# Patient Record
Sex: FEMALE | Race: AMERICAN INDIAN OR ALASKA NATIVE
[De-identification: names, ages, dates, MRNs, and addresses within clinical notes are randomized per-mention and may not be internally consistent; named-entity substitution may affect disease eponyms.]

---

## 2019-01-31 ENCOUNTER — HOSPITAL ENCOUNTER (EMERGENCY)
Dept: HOSPITAL 5 - ED | Age: 29
Discharge: HOME | End: 2019-01-31
Payer: SELF-PAY

## 2019-01-31 VITALS — DIASTOLIC BLOOD PRESSURE: 79 MMHG | SYSTOLIC BLOOD PRESSURE: 148 MMHG

## 2019-01-31 DIAGNOSIS — F12.10: ICD-10-CM

## 2019-01-31 DIAGNOSIS — N76.0: ICD-10-CM

## 2019-01-31 DIAGNOSIS — F17.200: ICD-10-CM

## 2019-01-31 DIAGNOSIS — A59.01: Primary | ICD-10-CM

## 2019-01-31 LAB
BILIRUB UR QL STRIP: (no result)
BLOOD UR QL VISUAL: (no result)
MUCOUS THREADS #/AREA URNS HPF: (no result) /HPF
PH UR STRIP: 5 [PH] (ref 5–7)
PROT UR STRIP-MCNC: (no result) MG/DL
RBC #/AREA URNS HPF: 11 /HPF (ref 0–6)
UROBILINOGEN UR-MCNC: 2 MG/DL (ref ?–2)
WBC #/AREA URNS HPF: 4 /HPF (ref 0–6)

## 2019-01-31 PROCEDURE — 96372 THER/PROPH/DIAG INJ SC/IM: CPT

## 2019-01-31 PROCEDURE — 99284 EMERGENCY DEPT VISIT MOD MDM: CPT

## 2019-01-31 PROCEDURE — 87591 N.GONORRHOEAE DNA AMP PROB: CPT

## 2019-01-31 PROCEDURE — 81025 URINE PREGNANCY TEST: CPT

## 2019-01-31 PROCEDURE — 81001 URINALYSIS AUTO W/SCOPE: CPT

## 2019-01-31 PROCEDURE — 87210 SMEAR WET MOUNT SALINE/INK: CPT

## 2019-01-31 NOTE — EMERGENCY DEPARTMENT REPORT
ED Female  HPI





- General


Chief complaint: Urogenital-Female


Stated complaint: VAGINAL DISCHARGE


Time Seen by Provider: 01/31/19 16:53


Source: patient


Mode of arrival: Ambulatory


Limitations: No Limitations





- History of Present Illness


Initial comments: 





This is a 28-year-old female presents to the complaining of malodorous vaginal 

discharge has been going on for about 2 weeks.  Patient states the past couple 

of days her vaginal discharge has gotten worse, she describes it as yellow, 

thick with vaginal itching.


She states last menstrual period was January 11 2019.  Patient reports being 

sexually active.  She denies pelvic pain, fever, nausea vomiting, abdominal 

pain, dysuria


MD Complaint: vaginal discharge


-: Gradual, week(s) (2)


Location: labia


Severity: moderate


Severity scale (0 -10): 5


Are you Pregnant Now?: No


Last Menstrual Period: 01/11/19


EDC: 10/18/19


Associated Symptoms: vaginal discharge.  denies: abdominal pain, 

nausea/vomiting, dysuria, rash





- Related Data


                                  Previous Rx's











 Medication  Instructions  Recorded  Last Taken  Type


 


Fluconazole [Diflucan] 150 mg PO ONCE #1 tablet 01/31/19 Unknown Rx











                                    Allergies











Allergy/AdvReac Type Severity Reaction Status Date / Time


 


No Known Allergies Allergy   Unverified 01/31/19 15:53














ED Review of Systems


ROS: 


Stated complaint: VAGINAL DISCHARGE


Other details as noted in HPI





Comment: All other systems reviewed and negative





ED Past Medical Hx





- Past Medical History


Previous Medical History?: No





- Surgical History


Past Surgical History?: No





- Social History


Smoking Status: Current Every Day Smoker


Substance Use Type: Marijuana





- Medications


Home Medications: 


                                Home Medications











 Medication  Instructions  Recorded  Confirmed  Last Taken  Type


 


Fluconazole [Diflucan] 150 mg PO ONCE #1 tablet 01/31/19  Unknown Rx














ED Physical Exam





- General


Limitations: No Limitations


General appearance: alert, in no apparent distress





- Head


Head exam: Present: atraumatic, normocephalic





- Eye


Eye exam: Present: normal appearance





- ENT


ENT exam: Present: mucous membranes moist





- Neck


Neck exam: Present: normal inspection





- Respiratory


Respiratory exam: Present: normal lung sounds bilaterally.  Absent: respiratory 

distress





- Cardiovascular


Cardiovascular Exam: Present: regular rate, normal rhythm.  Absent: systolic 

murmur, diastolic murmur, rubs, gallop





- GI/Abdominal


GI/Abdominal exam: Present: soft, normal bowel sounds.  Absent: distended, 

tenderness, guarding





- 


External exam: Present: normal external exam.  Absent: erythema, swelling


Speculum exam: Present: vaginal discharge (malodorous, green), cervical 

discharge


Bi-manual exam: Absent: cervical motion tendernes, adnexal tenderness, uterine 

enlargement, uterine tenderness





- Extremities Exam


Extremities exam: Present: normal inspection





- Back Exam


Back exam: Present: normal inspection





- Neurological Exam


Neurological exam: Present: alert, oriented X3





- Psychiatric


Psychiatric exam: Present: normal affect, normal mood





- Skin


Skin exam: Present: warm, dry, intact, normal color.  Absent: rash





ED Course


                                   Vital Signs











  01/31/19





  15:49


 


Temperature 97.8 F


 


Pulse Rate 94 H


 


Respiratory 16





Rate 


 


Blood Pressure 148/79


 


O2 Sat by Pulse 97





Oximetry 














ED Medical Decision Making





- Medical Decision Making


28-year-old male presents with STD .


ED course: Urinalysis and gonorrhea and Chlamydia cultures obtained.  Urinalysis

 positive for leukorrhea


Patient received 250 mg of Rocephin, azithromycin 1 g, Flagyl 2 g.


Wet prep positive for trichomoniasis


Discussed with patient  STD due to exposure.


Discussed with patient findings and treatment


Discussed prophylaxis treatment patient is to abstain from sex 7-10 days as 

treatment.


Discussed patient partner knowledge and treatment.


Discussed the follow-up with the health department for further STD testing.


Patient's alert and oriented times 3. Vital signs are normal patient is in no 

acute  discharge.


Patient will be discharged home with instructions.


Critical care attestation.: 


If time is entered above; I have spent that time in minutes in the direct care 

of this critically ill patient, excluding procedure time.








ED Disposition


Clinical Impression: 


 Trichomonal vaginitis, Vaginitis





Disposition: DC-01 TO HOME OR SELFCARE


Is pt being admited?: No


Does the pt Need Aspirin: No


Condition: Stable


Instructions:  Sexually Transmitted Diseases (ED), Safe Sex (ED), Trichomoniasis

 (ED), Bacterial Vaginosis (ED)


Additional Instructions: 


Make sure to follow up with the primary care physician as discussed.


Take all your medications as you've been prescribed.


If you have any worsening symptoms or develop new symptoms please return to ED 

immediately.


Prescriptions: 


Fluconazole [Diflucan] 150 mg PO ONCE #1 tablet


Forms:  Work/School Release Form(ED)


Time of Disposition: 18:53

## 2019-03-19 ENCOUNTER — HOSPITAL ENCOUNTER (EMERGENCY)
Dept: HOSPITAL 5 - ED | Age: 29
Discharge: HOME | End: 2019-03-19
Payer: COMMERCIAL

## 2019-03-19 VITALS — SYSTOLIC BLOOD PRESSURE: 113 MMHG | DIASTOLIC BLOOD PRESSURE: 68 MMHG

## 2019-03-19 DIAGNOSIS — B33.8: ICD-10-CM

## 2019-03-19 DIAGNOSIS — N76.0: Primary | ICD-10-CM

## 2019-03-19 DIAGNOSIS — A59.01: ICD-10-CM

## 2019-03-19 DIAGNOSIS — B37.3: ICD-10-CM

## 2019-03-19 LAB
BACTERIA #/AREA URNS HPF: (no result) /HPF
BILIRUB UR QL STRIP: (no result)
BLOOD UR QL VISUAL: (no result)
MUCOUS THREADS #/AREA URNS HPF: (no result) /HPF
PH UR STRIP: 6 [PH] (ref 5–7)
PROT UR STRIP-MCNC: (no result) MG/DL
RBC #/AREA URNS HPF: 35 /HPF (ref 0–6)
UROBILINOGEN UR-MCNC: 2 MG/DL (ref ?–2)
WBC #/AREA URNS HPF: 12 /HPF (ref 0–6)

## 2019-03-19 PROCEDURE — 99284 EMERGENCY DEPT VISIT MOD MDM: CPT

## 2019-03-19 PROCEDURE — 81001 URINALYSIS AUTO W/SCOPE: CPT

## 2019-03-19 PROCEDURE — 96372 THER/PROPH/DIAG INJ SC/IM: CPT

## 2019-03-19 PROCEDURE — 81025 URINE PREGNANCY TEST: CPT

## 2019-03-19 PROCEDURE — 87591 N.GONORRHOEAE DNA AMP PROB: CPT

## 2019-03-19 PROCEDURE — 87210 SMEAR WET MOUNT SALINE/INK: CPT

## 2019-03-19 NOTE — EMERGENCY DEPARTMENT REPORT
ED Dysuria HPI





- HPI


Chief Complaint: Urogenital-Female


Stated Complaint: VAGINAL ITCHING


Time Seen by Provider: 03/19/19 11:56


Duration: 5 Days


Severity: Mild


Symptoms: Dysuria: Yes, Frequency: No, Suprapubic Pain: No, Flank Pain: No, 

Fever: No, Hematuria: No, Abdominal Pain: No, Previous UTI's: No


Other History: Patient is a 28-year-old -American female who comes to the

ER today complaining of vaginal itching.  However, upon further review of her 

H&P she states that she was treated for trichomoniasis and gonorrhea last month 

but has not really gone better.  She did not follow up with her OB/GYN.  Patient

has no abdominal pain.  She is walking without difficulty.  She has no fever.  

Patient also states that she is concerned that she may be pregnant because her 

period is 3 days late.  Past medical history.  Gonorrhea/Trichomonas.  Obesity. 

Home medications none.  Patient does state that she finished her course of 

Diflucan and Flagyl given at the last visit





ED Review of Systems


ROS: 


Stated complaint: VAGINAL ITCHING


Other details as noted in HPI





Comment: All other systems reviewed and negative


Constitutional: denies: chills


Eyes: denies: eye pain


ENT: denies: ear pain


Respiratory: denies: see HPI


Cardiovascular: denies: dyspnea on exertion


Endocrine: denies: excessive sweating


Gastrointestinal: denies: abdominal pain


Genitourinary: as per HPI, discharge, abnormal menses.  denies: urgency, dysuria


Musculoskeletal: denies: back pain


Skin: denies: rash


Neurological: denies: headache


Psychiatric: denies: anxiety


Hematological/Lymphatic: denies: easy bleeding





ED Past Medical Hx





- Past Medical History


Previous Medical History?: No





- Surgical History


Past Surgical History?: No





- Family History


Family history: no significant





- Social History


Smoking Status: Never Smoker


Substance Use Type: None





- Medications


Home Medications: 


                                Home Medications











 Medication  Instructions  Recorded  Confirmed  Last Taken  Type


 


Fluconazole [Diflucan] 200 mg PO DAILY #3 tablet 03/19/19  Unknown Rx


 


metroNIDAZOLE [Flagyl] 500 mg PO Q12HR #20 tab 03/19/19  Unknown Rx














Dysuria Exam





- Exam


General: 


Vital signs noted. No distress. Alert and acting appropriately.





Exam: Yes Moist Mucous Membranes, No CVA Tenderness, No Abdominal Tenderness, No

 Rigidity or Guarding





ED Course


                                   Vital Signs











  03/19/19





  10:58


 


Temperature 98.8 F


 


Pulse Rate 72


 


Respiratory 20





Rate 


 


Blood Pressure 118/67


 


O2 Sat by Pulse 100





Oximetry 














ED Medical Decision Making





- Medical Decision Making





recent gonorrhea and trich


states she took pills as given 





continued vag dc


pt is not forthcoming with info





labs noted





empiric treatment with follow up


rocephin, azithro, flagyl, diflucan





                                   Vital Signs











  03/19/19





  10:58


 


Temperature 98.8 F


 


Pulse Rate 72


 


Respiratory 20





Rate 


 


Blood Pressure 118/67


 


O2 Sat by Pulse 100





Oximetry 








Labs











  03/19/19





  11:40


 


Urine Color  Yellow


 


Urine Turbidity  Slightly-cloudy


 


Urine pH  6.0


 


Ur Specific Gravity  1.024


 


Urine Protein  <15 mg/dl


 


Urine Glucose (UA)  Neg


 


Urine Ketones  Neg


 


Urine Blood  Sm


 


Urine Nitrite  Neg


 


Urine Bilirubin  Neg


 


Urine Urobilinogen  2.0


 


Ur Leukocyte Esterase  Lg


 


Urine WBC (Auto)  12.0 H


 


Urine RBC (Auto)  35.0


 


U Epithel Cells (Auto)  9.0


 


Urine Bacteria (Auto)  1+


 


Urine Mucus  Few


 


Urine HCG, Qual  Negative














- Differential Diagnosis


ro uti/preg/std


Critical care attestation.: 


If time is entered above; I have spent that time in minutes in the direct care 

of this critically ill patient, excluding procedure time.








ED Disposition


Clinical Impression: 


 STI (sexually transmitted infection), Trichomonosis, Bacterial vaginitis, 

Vaginal yeast infection





Disposition: DC-01 TO HOME OR SELFCARE


Is pt being admited?: No


Does the pt Need Aspirin: No


Condition: Stable


Instructions:  Safe Sex (ED)


Additional Instructions: 


safe sex





given recurrent STD you need to see OB asap


referral below 


take with you your medical records given today and last month





hydrate well with water





eat yogurt daily


Referrals: 


RUPESH MOTLEY MD [Primary Care Provider] - 3-5 Days


CHACHO BEJARANO MD [Staff Physician] - 3-5 Days


Forms:  STI Treatment and Prevention


Time of Disposition: 13:50

## 2019-09-27 ENCOUNTER — HOSPITAL ENCOUNTER (EMERGENCY)
Dept: HOSPITAL 5 - ED | Age: 29
Discharge: HOME | End: 2019-09-27
Payer: COMMERCIAL

## 2019-09-27 VITALS — SYSTOLIC BLOOD PRESSURE: 126 MMHG | DIASTOLIC BLOOD PRESSURE: 87 MMHG

## 2019-09-27 DIAGNOSIS — M72.2: Primary | ICD-10-CM

## 2019-09-27 DIAGNOSIS — F17.200: ICD-10-CM

## 2019-09-27 NOTE — XRAY REPORT
XR foot 3+V RT



INDICATION / CLINICAL INFORMATION:

Right foot pain.



COMPARISON:

None available.

 

FINDINGS:

BONES/JOINT(S): No acute fracture or subluxation. No significant degenerative changes.



SOFT TISSUES: No significant abnormality.



ADDITIONAL FINDINGS: None.







Signer Name: Willie Soriano MD 

Signed: 9/27/2019 3:08 AM

 Workstation Name: [id number]

## 2019-09-27 NOTE — EMERGENCY DEPARTMENT REPORT
ED Lower Extremity HPI





- General


Chief Complaint: Extremity Injury, Lower


Stated Complaint: RIGHT HEEL PAIN


Time Seen by Provider: 09/27/19 03:18


Source: patient


Mode of arrival: Ambulatory


Limitations: No Limitations





- History of Present Illness


Initial Comments: 





This is a 29-year-old female nontoxic, well nourished in appearance, no acute 

signs of distress presents to the ED with c/o of intermittent right heal pain 2

weeks.  Patient stated that she has been walking a lot.  Patient denies any 

other trauma.  Patient denies any numbness, tingling, fever, chills, nausea, 

vomiting, chest pain, shortness of breath, headache, stiff neck.  Patient denies

any joint swelling or joint redness.  Patient denies decreased range of motion. 

Patient stated has decreased gait due to pain.  Patient denies any allergies or 

significant past medical history.


MD Complaint: foot injury


-: week(s) (2)


Injury: Foot: Right


Severity: mild


Severity scale (0 -10): 8


Improves With: immobilization


Worsens With: weight bearing, palpation


Associated Symptoms: able to partially bear weight, ambulatory.  denies: 

snap/pop sensation, swelling, numbness, tingling, unable to bear weight





- Related Data


                                  Previous Rx's











 Medication  Instructions  Recorded  Last Taken  Type


 


Fluconazole [Diflucan] 200 mg PO DAILY #3 tablet 03/19/19 Unknown Rx


 


metroNIDAZOLE [Flagyl] 500 mg PO Q12HR #20 tab 03/19/19 Unknown Rx


 


Naproxen [Naprosyn TAB] 500 mg PO BID PRN #20 tablet 09/27/19 Unknown Rx











                                    Allergies











Allergy/AdvReac Type Severity Reaction Status Date / Time


 


No Known Allergies Allergy   Unverified 01/31/19 15:53














ED Review of Systems


ROS: 


Stated complaint: RIGHT HEEL PAIN


Other details as noted in HPI





Constitutional: denies: chills, fever


Eyes: denies: eye pain, eye discharge, vision change


ENT: denies: ear pain, throat pain


Respiratory: denies: cough, shortness of breath, wheezing


Cardiovascular: denies: chest pain, palpitations


Endocrine: no symptoms reported


Gastrointestinal: denies: abdominal pain, nausea, diarrhea


Genitourinary: denies: urgency, dysuria, discharge


Musculoskeletal: denies: back pain, joint swelling, arthralgia


Skin: denies: rash, lesions


Neurological: denies: headache, weakness, paresthesias


Psychiatric: denies: anxiety, depression


Hematological/Lymphatic: denies: easy bleeding, easy bruising





ED Past Medical Hx





- Past Medical History


Previous Medical History?: No





- Surgical History


Past Surgical History?: No





- Social History


Smoking Status: Current Every Day Smoker


Substance Use Type: Marijuana





- Medications


Home Medications: 


                                Home Medications











 Medication  Instructions  Recorded  Confirmed  Last Taken  Type


 


Fluconazole [Diflucan] 200 mg PO DAILY #3 tablet 03/19/19  Unknown Rx


 


metroNIDAZOLE [Flagyl] 500 mg PO Q12HR #20 tab 03/19/19  Unknown Rx


 


Naproxen [Naprosyn TAB] 500 mg PO BID PRN #20 tablet 09/27/19  Unknown Rx














ED Physical Exam





- General


Limitations: No Limitations


General appearance: alert, in no apparent distress





- Head


Head exam: Present: atraumatic, normocephalic





- Extremities Exam


Extremities exam: Present: normal inspection, full ROM, tenderness, normal 

capillary refill.  Absent: joint swelling





- Expanded Lower Extremity Exam


  ** Right


Hip exam: Present: normal inspection, full ROM.  Absent: tenderness, swelling


Upper Leg exam: Present: normal inspection, full ROM.  Absent: tenderness, 

swelling


Knee exam: Present: normal inspection, full ROM.  Absent: tenderness, swelling


Lower Leg exam: Present: normal inspection, full ROM.  Absent: tenderness, sw

elling


Ankle exam: Present: normal inspection, full ROM.  Absent: tenderness, swelling,

abrasion, laceration, ecchymosis, deformity, crepidus, dislocation, erythema, 

anterior draw sign


Foot/Toe exam: Present: normal inspection, full ROM, tenderness.  Absent: 

swelling, abrasion, laceration, ecchymosis, deformity, crepidus, dislocation, 

erythema, amputation, puncture wound, foreign body, calcaneal tenderness, 

tenderness at base of 5th metatarsal, nail avulsion, subungual hematoma


Neuro vascular tendon exam: Present: no vascular compromise


Gait: Positive: observed and limited by pain





                            __________________________














                            __________________________





 1 - pain here








- Back Exam


Back exam: Present: normal inspection, full ROM





- Neurological Exam


Neurological exam: Present: alert, oriented X3, normal gait





- Psychiatric


Psychiatric exam: Present: normal affect, normal mood





- Skin


Skin exam: Present: warm, dry, intact, normal color.  Absent: rash





ED Course





                                   Vital Signs











  09/27/19





  02:34


 


Temperature 98.3 F


 


Pulse Rate 80


 


Respiratory 20





Rate 


 


Blood Pressure 126/87


 


O2 Sat by Pulse 100





Oximetry 














- Reevaluation(s)


Reevaluation #1: 





09/27/19 03:48


Patient is speaking in full sentences with no signs of distress noted.





ED Lower Extremity MDM





- Medical Decision Making





This is a 29-year-old female that presents with right plantar fasciitis.  

Patient is stable and was examined by me.  I referred patient to an orthopedic 

doctor for further evaluation for possible MRI.  X-ray has been obtained and 

dictated by the radiologist.  Patient is notified of the x-ray report with noted

by the patient.  Patient does have normal gait with no tenderness and no joint 

swelling.  No ecchymosis. no joint redness or swelling. Not warm to touch. No 

signs of cellulites present. Patient was instructed to buy a roller OTC for 

plantar fasciitis.  Patient was instructed to RICE therapy.  Patient received 

Tylenol with Codeine for pain and stated that felt member will drop patient home

after discharge due to possible drowsiness.  Patient is discharged with 

naproxen. At time of discharge, the patient does not seem toxic or ill in 

appearance.  No acute signs of distress noted.  Patient agrees to discharge 

treatment plan of care.  No further questions noted by the patient.


Critical care attestation.: 


If time is entered above; I have spent that time in minutes in the direct care 

of this critically ill patient, excluding procedure time.








ED Disposition


Clinical Impression: 


 Plantar fasciitis of right foot





Disposition: DC-01 TO HOME OR SELFCARE


Is pt being admited?: No


Does the pt Need Aspirin: No


Condition: Stable


Instructions:  Plantar Fasciitis (ED), RICE Therapy (ED)


Additional Instructions: 


Follow-up with a orthopedic doctor in 3-5 days or if symptoms worsen and 

continue return to emergency room as soon as possible. 


Prescriptions: 


Naproxen [Naprosyn TAB] 500 mg PO BID PRN #20 tablet


 PRN Reason: Pain , Severe (7-10)


Referrals: 


PRIMARY CARE,MD [Primary Care Provider] - 3-5 Days


GERSON GUSMAN MD [Staff Physician] - 3-5 Days


[facility] [Outside] - 3-5 Days


Forms:  Work/School Release Form(ED)